# Patient Record
Sex: MALE | Race: BLACK OR AFRICAN AMERICAN | NOT HISPANIC OR LATINO | Employment: FULL TIME | URBAN - METROPOLITAN AREA
[De-identification: names, ages, dates, MRNs, and addresses within clinical notes are randomized per-mention and may not be internally consistent; named-entity substitution may affect disease eponyms.]

---

## 2024-09-21 NOTE — H&P (VIEW-ONLY)
Subjective:      Frank Palma is a 64 y.o. male who returns today regarding his     He met with Dr Lowe today and chose to schedule SBRT    No previous perineal surgery.    No IBD    The following portions of the patient's history were reviewed and updated as appropriate: allergies, current medications, past family history, past medical history, past social history, past surgical history and problem list.    Review of Systems  Pertinent items are noted in HPI.  A comprehensive multipoint review of systems was negative except as otherwise stated in the HPI.    No past medical history on file.  No past surgical history on file.  Diabetes Medications               glimepiride (AMARYL) 1 MG tablet Take 1 mg by mouth every morning.    metFORMIN (GLUCOPHAGE) 1000 MG tablet 1 tablet with a meal Oral twice a day for 90 days    TRULICITY 1.5 mg/0.5 mL pen injector INJECT 1.5MG UNDER THE SKIN 1 TIME A WEEK. subcutaneous once a week for 30 days          Review of patient's allergies indicates:   Allergen Reactions    Codeine Other (See Comments)     Tachycardia^Itchy skin          Objective:   Vitals: There were no vitals taken for this visit.    Physical Exam   General: alert and oriented, no acute distress  Respiratory: Symmetric expansion, non-labored breathing  Cardiovascular: no peripheral edema  Abdomen: soft, non distended  Skin: normal coloration and turgor, no rashes, no suspicious skin lesions noted  Neuro: no gross deficits  Psych: normal judgment and insight, normal mood/affect, and non-anxious    Physical Exam    Lab Review   Urinalysis demonstrates negative for all components  Lab Results   Component Value Date    WBC 3.97 08/19/2024    HGB 13.4 (L) 08/19/2024    HCT 40.1 08/19/2024    MCV 94 08/19/2024     08/19/2024     Lab Results   Component Value Date    CREATININE 1.0 08/19/2024    BUN 10 08/19/2024       Imaging  -    Assessment and Plan:   Prostate cancer    [place] 7 3+4 [id number]; psa  2.6        We discussed his grade and stage of disease, life expectancy and active surveillance vs treatment.  We discussed the roles of surgery, radiation (external and brachytherapy), HIFU and Cryosurgery, hormonal therapy and chemotherapy in the treatment of prostate cancer.  We discussed brachytherapy and external radiation therapy and open and robotic surgery.  We discussed the risks and benefits of each. We discussed erectile dysfunction, urinary incontinence and bowel issues.  We discussed referral to radiation oncology to further discuss options.  I answered his questions.     With only a single small focus of cancer and significant co-morbidities, active surveillance may be reasonable     However, he prefers XRT.    [facility] at [facility] 10/3  Will ask Dr Lowe if fiducial markers are needed        CVA on plavix  Hold plavix 1 week prior for [facility] placement if OK with cards    I spent 45 min on the day of this encounter preparing for, treating and managing the above

## 2024-09-25 ENCOUNTER — PATIENT MESSAGE (OUTPATIENT)
Dept: PREADMISSION TESTING | Facility: OTHER | Age: 64
End: 2024-09-25
Payer: COMMERCIAL

## 2024-09-25 NOTE — PRE-PROCEDURE INSTRUCTIONS
Pre admit phone call completed.    Instructions given to patient about NPO status as follows:     The evening before surgery do not eat anything after 9 p.m. ( this includes hard candy, chewing gum and mints).  You may only have GATORADE, POWERADE AND WATER from 9 p.m. until you leave your home. DO NOT  DRINK ANY LIQUIDS ON THE WAY TO THE HOSPITAL.      Patient was also instructed on the below information:    Park in the Parking lot behind the hospital or in the [employer] [facility] across the street from the parking lot.  Parking is complimentary.  If you will be discharged the same day as your procedure, please arrange for a responsible adult to drive you home or  to accompany you if traveling by taxi.  YOU WILL NOT BE PERMITTED TO DRIVE OR TO LEAVE THE HOSPITAL ALONE AFTER SURGERY.  It is strongly recommended that you arrange for someone to remain with you for the first 24 hrs following your surgery.    Patient verbalized understanding of above instructions.

## 2024-10-01 ENCOUNTER — TELEPHONE (OUTPATIENT)
Dept: RADIATION ONCOLOGY | Facility: CLINIC | Age: 64
End: 2024-10-01
Payer: COMMERCIAL

## 2024-10-01 NOTE — TELEPHONE ENCOUNTER
Returned call, no one answered. Left a voicemail requesting a call back. Contact information provided.

## 2024-10-02 ENCOUNTER — TELEPHONE (OUTPATIENT)
Dept: UROLOGY | Facility: CLINIC | Age: 64
End: 2024-10-02
Payer: COMMERCIAL

## 2024-10-02 ENCOUNTER — ANESTHESIA EVENT (OUTPATIENT)
Dept: SURGERY | Facility: OTHER | Age: 64
End: 2024-10-02
Payer: COMMERCIAL

## 2024-10-03 ENCOUNTER — HOSPITAL ENCOUNTER (OUTPATIENT)
Facility: OTHER | Age: 64
Discharge: HOME | End: 2024-10-03
Attending: UROLOGY | Admitting: UROLOGY
Payer: COMMERCIAL

## 2024-10-03 ENCOUNTER — ANESTHESIA (OUTPATIENT)
Dept: SURGERY | Facility: OTHER | Age: 64
End: 2024-10-03
Payer: COMMERCIAL

## 2024-10-03 DIAGNOSIS — C61 PROSTATE CANCER: ICD-10-CM

## 2024-10-03 LAB — POCT GLUCOSE: 113 MG/DL (ref 70–110)

## 2024-10-03 PROCEDURE — 36000706: Performed by: UROLOGY

## 2024-10-03 PROCEDURE — 36000707: Performed by: UROLOGY

## 2024-10-03 PROCEDURE — 25000003 PHARM REV CODE 250: Performed by: STUDENT IN AN ORGANIZED HEALTH CARE EDUCATION/TRAINING PROGRAM

## 2024-10-03 PROCEDURE — C1889 IMPLANT/INSERT DEVICE, NOC: HCPCS | Performed by: UROLOGY

## 2024-10-03 PROCEDURE — 37000009 HC ANESTHESIA EA ADD 15 MINS: Performed by: UROLOGY

## 2024-10-03 PROCEDURE — 63600175 PHARM REV CODE 636 W HCPCS: Performed by: UROLOGY

## 2024-10-03 PROCEDURE — 55874 TPRNL PLMT BIODEGRDABL MATRL: CPT | Mod: ,,, | Performed by: UROLOGY

## 2024-10-03 PROCEDURE — 37000008 HC ANESTHESIA 1ST 15 MINUTES: Performed by: UROLOGY

## 2024-10-03 PROCEDURE — 63600175 PHARM REV CODE 636 W HCPCS: Performed by: ANESTHESIOLOGY

## 2024-10-03 PROCEDURE — 71000016 HC POSTOP RECOV ADDL HR: Performed by: UROLOGY

## 2024-10-03 PROCEDURE — 63600175 PHARM REV CODE 636 W HCPCS: Performed by: STUDENT IN AN ORGANIZED HEALTH CARE EDUCATION/TRAINING PROGRAM

## 2024-10-03 PROCEDURE — 71000015 HC POSTOP RECOV 1ST HR: Performed by: UROLOGY

## 2024-10-03 PROCEDURE — 82962 GLUCOSE BLOOD TEST: CPT | Performed by: UROLOGY

## 2024-10-03 DEVICE — SYS SPACEOAR VUE HYDROCEL 10ML: Type: IMPLANTABLE DEVICE | Site: RECTUM | Status: FUNCTIONAL

## 2024-10-03 RX ORDER — HYDROMORPHONE HYDROCHLORIDE 2 MG/ML
0.4 INJECTION, SOLUTION INTRAMUSCULAR; INTRAVENOUS; SUBCUTANEOUS EVERY 5 MIN PRN
Status: CANCELLED | OUTPATIENT
Start: 2024-10-03

## 2024-10-03 RX ORDER — PROCHLORPERAZINE EDISYLATE 5 MG/ML
5 INJECTION INTRAMUSCULAR; INTRAVENOUS EVERY 30 MIN PRN
Status: CANCELLED | OUTPATIENT
Start: 2024-10-03

## 2024-10-03 RX ORDER — SODIUM CHLORIDE 0.9 % (FLUSH) 0.9 %
3 SYRINGE (ML) INJECTION
Status: CANCELLED | OUTPATIENT
Start: 2024-10-03

## 2024-10-03 RX ORDER — SODIUM CHLORIDE, SODIUM LACTATE, POTASSIUM CHLORIDE, CALCIUM CHLORIDE 600; 310; 30; 20 MG/100ML; MG/100ML; MG/100ML; MG/100ML
INJECTION, SOLUTION INTRAVENOUS CONTINUOUS
Status: DISCONTINUED | OUTPATIENT
Start: 2024-10-03 | End: 2024-10-03 | Stop reason: HOSPADM

## 2024-10-03 RX ORDER — CEFAZOLIN SODIUM 1 G/3ML
2 INJECTION, POWDER, FOR SOLUTION INTRAMUSCULAR; INTRAVENOUS
Status: COMPLETED | OUTPATIENT
Start: 2024-10-03 | End: 2024-10-03

## 2024-10-03 RX ORDER — PHENYLEPHRINE HYDROCHLORIDE 10 MG/ML
INJECTION INTRAVENOUS
Status: DISCONTINUED | OUTPATIENT
Start: 2024-10-03 | End: 2024-10-03

## 2024-10-03 RX ORDER — PROPOFOL 10 MG/ML
VIAL (ML) INTRAVENOUS CONTINUOUS PRN
Status: DISCONTINUED | OUTPATIENT
Start: 2024-10-03 | End: 2024-10-03

## 2024-10-03 RX ORDER — MEPERIDINE HYDROCHLORIDE 25 MG/ML
12.5 INJECTION INTRAMUSCULAR; INTRAVENOUS; SUBCUTANEOUS ONCE AS NEEDED
Status: CANCELLED | OUTPATIENT
Start: 2024-10-03 | End: 2024-10-04

## 2024-10-03 RX ORDER — EPHEDRINE SULFATE 50 MG/ML
INJECTION, SOLUTION INTRAVENOUS
Status: DISCONTINUED | OUTPATIENT
Start: 2024-10-03 | End: 2024-10-03

## 2024-10-03 RX ORDER — PROPOFOL 10 MG/ML
INJECTION, EMULSION INTRAVENOUS
Status: DISCONTINUED | OUTPATIENT
Start: 2024-10-03 | End: 2024-10-03

## 2024-10-03 RX ORDER — GLUCAGON 1 MG
1 KIT INJECTION
Status: CANCELLED | OUTPATIENT
Start: 2024-10-03

## 2024-10-03 RX ORDER — OXYCODONE HYDROCHLORIDE 5 MG/1
5 TABLET ORAL
Status: CANCELLED | OUTPATIENT
Start: 2024-10-03

## 2024-10-03 RX ORDER — LIDOCAINE HYDROCHLORIDE 20 MG/ML
INJECTION INTRAVENOUS
Status: DISCONTINUED | OUTPATIENT
Start: 2024-10-03 | End: 2024-10-03

## 2024-10-03 RX ORDER — LIDOCAINE HYDROCHLORIDE 20 MG/ML
JELLY TOPICAL ONCE
Status: DISCONTINUED | OUTPATIENT
Start: 2024-10-03 | End: 2024-10-03 | Stop reason: HOSPADM

## 2024-10-03 RX ORDER — ONDANSETRON HYDROCHLORIDE 2 MG/ML
INJECTION, SOLUTION INTRAVENOUS
Status: DISCONTINUED | OUTPATIENT
Start: 2024-10-03 | End: 2024-10-03

## 2024-10-03 RX ORDER — TAMSULOSIN HYDROCHLORIDE 0.4 MG/1
0.4 CAPSULE ORAL DAILY
Qty: 30 CAPSULE | Refills: 11 | Status: SHIPPED | OUTPATIENT
Start: 2024-10-03 | End: 2024-11-02

## 2024-10-03 RX ORDER — ACETAMINOPHEN 325 MG/1
650 TABLET ORAL EVERY 4 HOURS PRN
Status: CANCELLED | OUTPATIENT
Start: 2024-10-03

## 2024-10-03 RX ORDER — FENTANYL CITRATE 50 UG/ML
INJECTION, SOLUTION INTRAMUSCULAR; INTRAVENOUS
Status: DISCONTINUED | OUTPATIENT
Start: 2024-10-03 | End: 2024-10-03

## 2024-10-03 RX ADMIN — PHENYLEPHRINE HYDROCHLORIDE 100 MCG: 10 INJECTION INTRAVENOUS at 07:10

## 2024-10-03 RX ADMIN — PROPOFOL 50 MG: 10 INJECTION, EMULSION INTRAVENOUS at 06:10

## 2024-10-03 RX ADMIN — PROPOFOL 150 MCG/KG/MIN: 10 INJECTION, EMULSION INTRAVENOUS at 06:10

## 2024-10-03 RX ADMIN — GLYCOPYRROLATE 0.2 MG: 0.2 INJECTION, SOLUTION INTRAMUSCULAR; INTRAVITREAL at 07:10

## 2024-10-03 RX ADMIN — ONDANSETRON HYDROCHLORIDE 4 MG: 2 INJECTION INTRAMUSCULAR; INTRAVENOUS at 07:10

## 2024-10-03 RX ADMIN — LIDOCAINE HYDROCHLORIDE 60 MG: 20 INJECTION, SOLUTION INTRAVENOUS at 06:10

## 2024-10-03 RX ADMIN — FENTANYL CITRATE 25 MCG: 50 INJECTION, SOLUTION INTRAMUSCULAR; INTRAVENOUS at 07:10

## 2024-10-03 RX ADMIN — EPHEDRINE SULFATE 10 MG: 50 INJECTION INTRAVENOUS at 07:10

## 2024-10-03 RX ADMIN — CEFAZOLIN 2 G: 330 INJECTION, POWDER, FOR SOLUTION INTRAMUSCULAR; INTRAVENOUS at 07:10

## 2024-10-03 RX ADMIN — SODIUM CHLORIDE, SODIUM LACTATE, POTASSIUM CHLORIDE, AND CALCIUM CHLORIDE: 600; 310; 30; 20 INJECTION, SOLUTION INTRAVENOUS at 06:10

## 2024-10-03 NOTE — PLAN OF CARE
Frank GLENYS Presleys has met all discharge criteria from Phase II. Vital Signs are stable, ambulating  without difficulty. Discharge instructions given, patient verbalized understanding. Discharged from facility via wheelchair in stable condition.

## 2024-10-03 NOTE — DISCHARGE SUMMARY
[facility])  Urology  Discharge Summary      Patient Name: Frank Palma  MRN: [id number]  Admission Date: 10/3/2024  Hospital Length of Stay: 0 days  Discharge Date and Time: No discharge date for patient encounter.  Attending Physician: Joselito Isidro MD   Discharging Provider: Joselito Isidro MD  Primary Care Physician: Tylor Montes MD    HPI:   No notes on file    Procedure(s) (LRB):  TRANSPERINEAL INSERTION OF PERIPROSTATIC GEL (N/A)     Indwelling Lines/Drains at time of discharge:   Lines/Drains/Airways       None                   Hospital Course (synopsis of major diagnoses, care, treatment, and services provided during the course of the hospital stay):   Tolerated procedure well    Goals of Care Treatment Preferences:         Consults:     Significant Diagnostic Studies:  Ultrasound shows symmetric placement of Hydrogel from the apex to the base of the prostate.  No complications    Pending Diagnostic Studies:       None            There are no hospital problems to display for this patient.        Discharged Condition: good    Disposition: Home or Self Care    Follow Up:   Follow-up Information       Ezra Lowe Jr., MD Follow up.    Specialty: Radiation Oncology  Contact information:  [address]  [place] [address]  [phone]               Joselito Isidro MD Follow up in 6 month(s).    Specialty: Urology  Why: with psa  Contact information:  [address] [address]  [facility] [id number] [phone]                           Patient Instructions:      Diet general     Call MD for:  temperature >100.4     Call MD for:  persistent nausea and vomiting     Call MD for:  severe uncontrolled pain     Call MD for:  difficulty breathing, headache or visual disturbances     Call MD for:  redness, tenderness, or signs of infection (pain, swelling, redness, odor or green/yellow discharge around incision site)     Call MD for:  hives     Call MD for:  persistent dizziness  or light-headedness     Call MD for:  extreme fatigue     Call MD for:   Order Comments: Call MD or go to the nearest ER if you are unable to urinate.     Nursing communication   Order Comments: OK to restart plavix tomorrow     Medications:  Reconciled Home Medications:      Medication List        START taking these medications      tamsulosin 0.4 mg Cap  Commonly known as: FLOMAX  Take 1 capsule (0.4 mg total) by mouth once daily.            CONTINUE taking these medications      amLODIPine 10 MG tablet  Commonly known as: NORVASC  TAKE (1) TABLET BY MOUTH 1 TIME A DAY. Orally Once a day for 90 days     ascorbic acid (vitamin C) 1000 MG tablet  Commonly known as: VITAMIN C  Take 1 tablet by mouth once daily.     atorvastatin 40 MG tablet  Commonly known as: LIPITOR  take 1 tablet (40 mg) by oral route once daily Orally Once a day for 90 day(s)     benazepriL 40 MG tablet  Commonly known as: LOTENSIN  1 tablet by Oral route 1 time per day Orally Once a day for 90 days     clopidogreL 75 mg tablet  Commonly known as: PLAVIX  TAKE ONE TABLET BY MOUTH ONCE DAILY Orally Once a day for 90 days     glimepiride 1 MG tablet  Commonly known as: AMARYL  Take 1 mg by mouth every morning.     hydroCHLOROthiazide 25 MG tablet  Commonly known as: HYDRODIURIL  TAKE (1) TABLET BY MOUTH 1 TIME A DAY. Orally Once a day for 90 days     INV Pfizer-Vertishear COVID-19 vaccine  Pfizer-Experticity COVID-19 Vacc     loratadine 10 mg tablet  Commonly known as: CLARITIN  1 tablet by Oral route 1 time per day Orally Once a day for 90 day(s)     metFORMIN 1000 MG tablet  Commonly known as: GLUCOPHAGE  daily with breakfast.     omega-3 fatty acids 1,000 mg Cap  Take 2 capsules by mouth once daily.     TRULICITY 1.5 mg/0.5 mL pen injector  Generic drug: dulaglutide  INJECT 1.5MG UNDER THE SKIN 1 TIME A WEEK. subcutaneous once a week for 30 days            He will follow up with radiation oncology for simulation.  We have started him on Flomax and he  will see me in 6 months with a PSA or sooner if he has any problems    Time spent on the discharge of patient: 25 minutes    Joselito Isidro MD  Urology  [facility] ([place])

## 2024-10-03 NOTE — OP NOTE
[facility] ([facility]  Surgery Department  Operative Note    SUMMARY     Date of Procedure: 10/3/2024     Procedure: Procedure(s) (LRB):  TRANSPERINEAL INSERTION OF PERIPROSTATIC GEL (N/A)     Surgeons and Role:     * Joselito Isidro MD - Primary        Assistant: none    Pre-Operative Diagnosis: Prostate cancer [C61]  [place] 7 3+4 [id number]; psa 2.6     Post-Operative Diagnosis: Post-Op Diagnosis Codes:     * Prostate cancer [C61]    Anesthesia: Monitor Anesthesia Care    Complications: No    Estimated Blood Loss (EBL): * No values recorded between 10/3/2024  7:20 AM and 10/3/2024  7:53 AM *           Specimens:   Specimen (24h ago, onward)      None                    Condition: Good    Disposition: PACU - hemodynamically stable.    Attestation: I performed the procedure.    History:  4-year-old man with a recent diagnosis of [place] 7 clinical T2c prostatic adenocarcinoma.  He is scheduled for radiation therapy and presents for insertion of periprostatic gel.  Informed consent was obtained.      Procedure In Detail:  The patient is brought to the operating room and underwent MAC anesthesia.  Preoperative antibiotics were administered and Liban's and SCDs were applied.  He was placed in the dorsal lithotomy position and sterile prep and drape was performed.  The BK trans rectal ultrasound probe was advanced into the rectum utilizing the stepping device.  The prostate was imaged in 2 planes.  In the sagittal plane a needle was inserted into the perirectal fat.  This was verified with sagittal and transverse images.  Hydro dissection was performed with saline.  There was symmetric dissection of the tissues bilaterally from the apex to the base.  The Hydrogel was injected.  This preliminary well.  The prostate was imaged laterally and in the midline.  There was good symmetric injection of the Henley from the apex to the base and bilaterally.  The needle was removed and the gel remained in place.  The probe  was removed.  The patient tolerated the procedure well    He will follow up with radiation oncology for simulation.  We have started him on Flomax and he will see me in 6 months with a PSA or sooner if he has any problems

## 2024-10-03 NOTE — ANESTHESIA POSTPROCEDURE EVALUATION
Anesthesia Post Evaluation    Patient: Frank Palma    Procedure(s) Performed: Procedure(s) (LRB):  TRANSPERINEAL INSERTION OF PERIPROSTATIC GEL (N/A)    Final Anesthesia Type: MAC      Patient location during evaluation: [facility]  Patient participation: Yes- Able to Participate  Level of consciousness: awake and alert, awake and responds to stimulation  Post-procedure vital signs: reviewed and stable  Pain management: adequate  Airway patency: patent    PONV status at discharge: No PONV  Anesthetic complications: no      Cardiovascular status: blood pressure returned to baseline, hemodynamically stable and stable  Respiratory status: spontaneous ventilation and unassisted  Hydration status: euvolemic  Follow-up not needed.              Vitals Value Taken Time   /60 10/03/24 0545   Temp 36.6 °C (97.8 °F) 10/03/24 0545   Pulse 78 10/03/24 0545   Resp 16 10/03/24 0545   SpO2 94 % 10/03/24 0545         No case tracking events are documented in the log.      Pain/Dayna Score: No data recorded

## 2024-10-03 NOTE — ANESTHESIA PREPROCEDURE EVALUATION
10/03/2024  Frank Palma is a 64 y.o., male.      Pre-op Assessment    I have reviewed the Patient Summary Reports.     I have reviewed the Nursing Notes. I have reviewed the NPO Status.   I have reviewed the Medications.     Review of Systems  Anesthesia Hx:  No problems with previous Anesthesia                Social:  Non-Smoker       Hematology/Oncology:  Hematology Normal   Oncology Normal                                   EENT/Dental:  EENT/Dental Normal           Cardiovascular:     Hypertension           hyperlipidemia                             Pulmonary:  Pulmonary Normal                       Hepatic/GI:  Hepatic/GI Normal                 Musculoskeletal:  Musculoskeletal Normal                Neurological:  TIA,  Denies CVA.                                    Endocrine:  Diabetes, well controlled           Dermatological:  Skin Normal    Psych:  Psychiatric Normal                    Physical Exam  General: Well nourished, Cooperative and Alert    Airway:  Mallampati: II   Mouth Opening: Normal  TM Distance: Normal  Tongue: Normal  Neck ROM: Normal ROM    Dental:  Intact        Anesthesia Plan  Type of Anesthesia, risks & benefits discussed:    Anesthesia Type: MAC  Intra-op Monitoring Plan: Standard ASA Monitors  Post Op Pain Control Plan: multimodal analgesia  Induction:  IV  Informed Consent: Informed consent signed with the Patient and all parties understand the risks and agree with anesthesia plan.  All questions answered.   ASA Score: 2    Ready For Surgery From Anesthesia Perspective.     .

## 2024-10-04 VITALS
WEIGHT: 238 LBS | HEART RATE: 60 BPM | OXYGEN SATURATION: 97 % | RESPIRATION RATE: 16 BRPM | TEMPERATURE: 98 F | BODY MASS INDEX: 33.32 KG/M2 | SYSTOLIC BLOOD PRESSURE: 125 MMHG | HEIGHT: 71 IN | DIASTOLIC BLOOD PRESSURE: 75 MMHG

## 2024-10-08 ENCOUNTER — HOSPITAL ENCOUNTER (OUTPATIENT)
Dept: RADIATION THERAPY | Facility: HOSPITAL | Age: 64
Discharge: HOME | End: 2024-10-08
Payer: COMMERCIAL

## 2024-10-08 PROCEDURE — 77334 RADIATION TREATMENT AID(S): CPT | Mod: 26,,, | Performed by: RADIOLOGY

## 2024-10-08 PROCEDURE — 77014 HC CT GUIDANCE RADIATION THERAPY FLDS PLACEMENT: CPT | Mod: TC | Performed by: RADIOLOGY

## 2024-10-08 PROCEDURE — 77014 PR  CT GUIDANCE PLACEMENT RAD THERAPY FIELDS: CPT | Mod: 26,,, | Performed by: RADIOLOGY

## 2024-10-08 PROCEDURE — 77334 RADIATION TREATMENT AID(S): CPT | Mod: TC | Performed by: RADIOLOGY

## 2024-10-08 PROCEDURE — 77263 THER RADIOLOGY TX PLNG CPLX: CPT | Mod: ,,, | Performed by: RADIOLOGY

## 2024-10-28 ENCOUNTER — PATIENT MESSAGE (OUTPATIENT)
Dept: RADIATION ONCOLOGY | Facility: CLINIC | Age: 64
End: 2024-10-28
Payer: COMMERCIAL

## 2024-11-01 ENCOUNTER — HOSPITAL ENCOUNTER (OUTPATIENT)
Dept: RADIATION THERAPY | Facility: HOSPITAL | Age: 64
Discharge: HOME | End: 2024-11-01
Attending: RADIOLOGY
Payer: COMMERCIAL

## 2024-11-01 PROCEDURE — 77014 PR  CT GUIDANCE PLACEMENT RAD THERAPY FIELDS: CPT | Mod: 26,,, | Performed by: RADIOLOGY

## 2024-11-01 PROCEDURE — 77373 STRTCTC BDY RAD THER TX DLVR: CPT | Performed by: RADIOLOGY

## 2024-11-05 ENCOUNTER — DOCUMENTATION ONLY (OUTPATIENT)
Dept: RADIATION ONCOLOGY | Facility: CLINIC | Age: 64
End: 2024-11-05

## 2024-11-05 PROCEDURE — 77014 PR  CT GUIDANCE PLACEMENT RAD THERAPY FIELDS: CPT | Mod: 26,,, | Performed by: RADIOLOGY

## 2024-11-05 PROCEDURE — 77373 STRTCTC BDY RAD THER TX DLVR: CPT | Performed by: RADIOLOGY

## 2024-11-07 DIAGNOSIS — C61 PROSTATE CANCER: Primary | ICD-10-CM

## 2025-02-05 ENCOUNTER — LAB VISIT (OUTPATIENT)
Dept: LAB | Facility: HOSPITAL | Age: 65
End: 2025-02-05
Attending: RADIOLOGY
Payer: COMMERCIAL

## 2025-02-05 DIAGNOSIS — C61 PROSTATE CANCER: ICD-10-CM

## 2025-02-05 LAB — COMPLEXED PSA SERPL-MCNC: 2.6 NG/ML (ref 0–4)

## 2025-02-05 PROCEDURE — 84153 ASSAY OF PSA TOTAL: CPT | Performed by: RADIOLOGY

## 2025-02-05 PROCEDURE — 36415 COLL VENOUS BLD VENIPUNCTURE: CPT | Performed by: RADIOLOGY

## 2025-02-10 ENCOUNTER — OFFICE VISIT (OUTPATIENT)
Dept: RADIATION ONCOLOGY | Facility: CLINIC | Age: 65
End: 2025-02-10
Attending: RADIOLOGY
Payer: COMMERCIAL

## 2025-02-10 VITALS
HEART RATE: 77 BPM | SYSTOLIC BLOOD PRESSURE: 132 MMHG | WEIGHT: 252.69 LBS | BODY MASS INDEX: 35.38 KG/M2 | HEIGHT: 71 IN | RESPIRATION RATE: 16 BRPM | DIASTOLIC BLOOD PRESSURE: 66 MMHG | OXYGEN SATURATION: 94 %

## 2025-02-10 DIAGNOSIS — C61 PROSTATE CANCER: Primary | ICD-10-CM

## 2025-02-10 PROCEDURE — 1159F MED LIST DOCD IN RCRD: CPT | Mod: CPTII,S$GLB,, | Performed by: RADIOLOGY

## 2025-02-10 PROCEDURE — 3075F SYST BP GE 130 - 139MM HG: CPT | Mod: CPTII,S$GLB,, | Performed by: RADIOLOGY

## 2025-02-10 PROCEDURE — 1126F AMNT PAIN NOTED NONE PRSNT: CPT | Mod: CPTII,S$GLB,, | Performed by: RADIOLOGY

## 2025-02-10 PROCEDURE — 1101F PT FALLS ASSESS-DOCD LE1/YR: CPT | Mod: CPTII,S$GLB,, | Performed by: RADIOLOGY

## 2025-02-10 PROCEDURE — 99024 POSTOP FOLLOW-UP VISIT: CPT | Mod: S$GLB,,, | Performed by: RADIOLOGY

## 2025-02-10 PROCEDURE — 99999 PR PBB SHADOW E&M-EST. PATIENT-LVL IV: CPT | Mod: PBBFAC,,, | Performed by: RADIOLOGY

## 2025-02-10 PROCEDURE — 3078F DIAST BP <80 MM HG: CPT | Mod: CPTII,S$GLB,, | Performed by: RADIOLOGY

## 2025-02-10 PROCEDURE — 3288F FALL RISK ASSESSMENT DOCD: CPT | Mod: CPTII,S$GLB,, | Performed by: RADIOLOGY

## 2025-02-10 NOTE — PROGRESS NOTES
[facility] - Radiation Oncology     Patient ID: Frank Palma is a 64 y.o. male.    Chief Complaint: Prostate Cancer (F/u after xrt;psa)      Mr. Palma was recently diagnosed with clinical Stage IIB, (T1c, N0, M0, P<10, GG2) adenocarcinoma of the prostate.    He presented after PSA in February of 2024 returned at 7.7 ng/ml.  MRI on 8/12/24 revealed a 22 cc prostate with a 1 cm PI-RADS 3 lesion in the mid Lt. transitional zone with no extraprostatic extension.  The seminal vesicles and neurovascular bundles were unremarkable.  Biopsies on 8/16/24 revealed [place] 7 (3+4) adenocarcinoma involving 30% of the specimen from the target lesion. The remaining 12 cores from throughout the prostate gland were benign.  Polaris molecular score indicated active surveillance with a 2% 10 year disease specific mortality rate. The patient  elected to pursue active treatment and completed a course of SBRT to the prostate gland on 11/7/24.  Today the patient states he feels well.  No complaints.       Review of Systems   Constitutional:  Negative for activity change, appetite change, chills, diaphoresis and fatigue.   Gastrointestinal:  Negative for blood in stool, change in bowel habit, constipation, diarrhea and fecal incontinence.   Genitourinary:  Negative for bladder incontinence, difficulty urinating, dysuria, frequency and hematuria.       Physical Exam  Constitutional:       General: He is not in acute distress.     Appearance: Normal appearance.   Abdominal:      General: There is no distension.      Palpations: Abdomen is soft.   Neurological:      Mental Status: He is alert and oriented to person, place, and time.   Psychiatric:         Mood and Affect: Mood normal.         Judgment: Judgment normal.        Latest Reference Range & Units 07/15/24 14:25 02/05/25 11:42   PSA Diagnostic 0.00 - 4.00 ng/mL  2.6   PSA Total 0.00 - 4.00 ng/mL 2.6           Assessment and Plan    Prostate cancer -  recovered well from the acute effects of radiotherapy.  No change in PSA at this point.  Plan follow up in 6 months with PSA.

## 2025-04-02 NOTE — PROGRESS NOTES
Subjective:      Frank Palma is a 65 y.o. male who returns today regarding his     .    No hematuria  No incontinence    No LUTS    No blood in stool    Has generlized fatigue      The following portions of the patient's history were reviewed and updated as appropriate: allergies, current medications, past family history, past medical history, past social history, past surgical history and problem list.    Review of Systems  Pertinent items are noted in HPI.  A comprehensive multipoint review of systems was negative except as otherwise stated in the HPI.    Past Medical History:   Diagnosis Date    Anticoagulant long-term use     Diabetes mellitus     Hypertension     Prostate cancer     Stroke     2009     Past Surgical History:   Procedure Laterality Date    PROSTATE BIOPSY  08/16/2024    TRANSPERINEAL INSERTION OF PERIPROSTATIC GEL N/A 10/3/2024    Procedure: TRANSPERINEAL INSERTION OF PERIPROSTATIC GEL;  Surgeon: Joselito Isidro MD;  Location: [facility];  Service: Urology;  Laterality: N/A;     Diabetes Medications              glimepiride (AMARYL) 1 MG tablet Take 1 mg by mouth every morning.    metFORMIN (GLUCOPHAGE) 1000 MG tablet daily with breakfast.    TRULICITY 1.5 mg/0.5 mL pen injector INJECT 1.5MG UNDER THE SKIN 1 TIME A WEEK. subcutaneous once a week for 30 days          Review of patient's allergies indicates:   Allergen Reactions    Codeine Other (See Comments)     Tachycardia^Itchy skin  Unsure of what he can take          Objective:   Vitals: There were no vitals taken for this visit.    Physical Exam   General: alert and oriented, no acute distress  Respiratory: Symmetric expansion, non-labored breathing  Cardiovascular: no peripheral edema  Abdomen: non distended  Skin: normal coloration and turgor, no rashes, no suspicious skin lesions noted  Neuro: no gross deficits  Psych: normal judgment and insight, normal mood/affect, and non-anxious    Physical Exam    Lab Review   Urinalysis  demonstrates no specimen    Lab Results   Component Value Date    WBC 3.97 08/19/2024    HGB 13.4 (L) 08/19/2024    HCT 40.1 08/19/2024    MCV 94 08/19/2024     08/19/2024     Lab Results   Component Value Date    CREATININE 1.0 08/19/2024    BUN 10 08/19/2024     Lab Results   Component Value Date    PSADIAG 2.6 02/05/2025    PSATOTAL 2.6 07/15/2024                     Imaging  -    Assessment and Plan:   Prostate cancer  [place] 7 3+4 [id number]; psa 2.6 sp SBRT 11/2024  RTC 6 months with psa to see NP    Doing well        Fatigue  He did not receive androgen deprivation therapy so I do not think this is directly related to his prostate ca or his SBRT  Follow up with PCP, Dr Snider    Visit today included increased complexity associated with the care of the episodic problem prostate cancer addressed and managing the longitudinal care of the patient due to the serious and/or complex managed problem(s) prostate cancer.

## 2025-04-04 ENCOUNTER — OFFICE VISIT (OUTPATIENT)
Dept: UROLOGY | Facility: CLINIC | Age: 65
End: 2025-04-04
Payer: COMMERCIAL

## 2025-04-04 ENCOUNTER — TELEPHONE (OUTPATIENT)
Dept: UROLOGY | Facility: CLINIC | Age: 65
End: 2025-04-04

## 2025-04-04 VITALS
DIASTOLIC BLOOD PRESSURE: 77 MMHG | SYSTOLIC BLOOD PRESSURE: 147 MMHG | HEIGHT: 71 IN | OXYGEN SATURATION: 94 % | HEART RATE: 74 BPM | BODY MASS INDEX: 35.24 KG/M2

## 2025-04-04 DIAGNOSIS — C61 PROSTATE CANCER: Primary | ICD-10-CM

## 2025-04-04 NOTE — TELEPHONE ENCOUNTER
----- Message from Joselito Isidro MD sent at 4/4/2025 10:18 AM CDT -----  PSA in 6 months then see Claire Huerta NP  thanks

## 2025-08-13 ENCOUNTER — LAB VISIT (OUTPATIENT)
Dept: LAB | Facility: HOSPITAL | Age: 65
End: 2025-08-13
Attending: RADIOLOGY
Payer: COMMERCIAL

## 2025-08-13 DIAGNOSIS — C61 PROSTATE CANCER: ICD-10-CM

## 2025-08-13 LAB — PSA SERPL-MCNC: 1.56 NG/ML

## 2025-08-13 PROCEDURE — 84153 ASSAY OF PSA TOTAL: CPT

## 2025-08-13 PROCEDURE — 36415 COLL VENOUS BLD VENIPUNCTURE: CPT

## (undated) DEVICE — SYR 10CC LUER LOCK

## (undated) DEVICE — SOL POVIDONE SCRUB IODINE 4 OZ

## (undated) DEVICE — ULTRASOUND RENTAL

## (undated) DEVICE — DRAPE UND BUTT W/POUCH 40X44IN

## (undated) DEVICE — COVER TRANSDUCER LATEX N/STERI

## (undated) DEVICE — MARKER SKIN STND TIP BLUE BARR

## (undated) DEVICE — BOWL STERILE LARGE 32OZ

## (undated) DEVICE — JELLY SURGILUBE LUBE TUBE 2OZ

## (undated) DEVICE — TAPE SILK 3IN

## (undated) DEVICE — SYR SALINE FLSH PRFL STRL 10ML

## (undated) DEVICE — Device

## (undated) DEVICE — GAUZE SPONGE 4X4 12PLY

## (undated) DEVICE — CLIPPER BLADE MOD 4406 (CAREF)

## (undated) DEVICE — COVER TABLE REINF 50X90IN

## (undated) DEVICE — GLOVE SENSICARE PI ORTHO 7.0

## (undated) DEVICE — TOWEL OR DISP STRL BLUE 4/PK

## (undated) DEVICE — GEL AQUASONIC 100 STERILE20GM